# Patient Record
Sex: MALE | Race: WHITE | URBAN - METROPOLITAN AREA
[De-identification: names, ages, dates, MRNs, and addresses within clinical notes are randomized per-mention and may not be internally consistent; named-entity substitution may affect disease eponyms.]

---

## 2017-02-21 ENCOUNTER — TRANSFERRED RECORDS (OUTPATIENT)
Dept: HEALTH INFORMATION MANAGEMENT | Facility: CLINIC | Age: 52
End: 2017-02-21

## 2017-03-23 ENCOUNTER — TRANSFERRED RECORDS (OUTPATIENT)
Dept: HEALTH INFORMATION MANAGEMENT | Facility: CLINIC | Age: 52
End: 2017-03-23

## 2017-03-27 ENCOUNTER — TRANSFERRED RECORDS (OUTPATIENT)
Dept: HEALTH INFORMATION MANAGEMENT | Facility: CLINIC | Age: 52
End: 2017-03-27

## 2017-04-11 ENCOUNTER — TRANSFERRED RECORDS (OUTPATIENT)
Dept: HEALTH INFORMATION MANAGEMENT | Facility: CLINIC | Age: 52
End: 2017-04-11

## 2017-04-12 ENCOUNTER — MEDICAL CORRESPONDENCE (OUTPATIENT)
Dept: HEALTH INFORMATION MANAGEMENT | Facility: CLINIC | Age: 52
End: 2017-04-12

## 2017-05-25 ENCOUNTER — TRANSFERRED RECORDS (OUTPATIENT)
Dept: HEALTH INFORMATION MANAGEMENT | Facility: CLINIC | Age: 52
End: 2017-05-25

## 2017-06-08 ENCOUNTER — TRANSFERRED RECORDS (OUTPATIENT)
Dept: HEALTH INFORMATION MANAGEMENT | Facility: CLINIC | Age: 52
End: 2017-06-08

## 2017-06-19 ENCOUNTER — TRANSFERRED RECORDS (OUTPATIENT)
Dept: HEALTH INFORMATION MANAGEMENT | Facility: CLINIC | Age: 52
End: 2017-06-19

## 2017-06-28 ENCOUNTER — TRANSFERRED RECORDS (OUTPATIENT)
Dept: HEALTH INFORMATION MANAGEMENT | Facility: CLINIC | Age: 52
End: 2017-06-28

## 2017-07-14 ENCOUNTER — TRANSFERRED RECORDS (OUTPATIENT)
Dept: HEALTH INFORMATION MANAGEMENT | Facility: CLINIC | Age: 52
End: 2017-07-14

## 2017-07-15 ENCOUNTER — TRANSFERRED RECORDS (OUTPATIENT)
Dept: HEALTH INFORMATION MANAGEMENT | Facility: CLINIC | Age: 52
End: 2017-07-15

## 2017-07-28 ENCOUNTER — TRANSFERRED RECORDS (OUTPATIENT)
Dept: HEALTH INFORMATION MANAGEMENT | Facility: CLINIC | Age: 52
End: 2017-07-28

## 2017-08-01 ENCOUNTER — PRE VISIT (OUTPATIENT)
Dept: GASTROENTEROLOGY | Facility: CLINIC | Age: 52
End: 2017-08-01

## 2017-08-01 NOTE — TELEPHONE ENCOUNTER
1.  Date/reason for appt: 8/9/17 2PM - Abd Pain  2.  Referring provider: Dr. Boyd Shay  3.  Call to patient (Yes / No - short description): no, pt is referred  4.  Previous care at / records requested from: [facility] -- records are scanned in [facility], need imaging. CT Abd/Pelvis 2/21/17 report is scanned in [facility].        ** Faxed request to [facility] to mail CD of imaging.

## 2017-08-08 ENCOUNTER — TELEPHONE (OUTPATIENT)
Dept: GASTROENTEROLOGY | Facility: CLINIC | Age: 52
End: 2017-08-08

## 2017-08-09 ENCOUNTER — OFFICE VISIT (OUTPATIENT)
Dept: GASTROENTEROLOGY | Facility: CLINIC | Age: 52
End: 2017-08-09

## 2017-08-09 VITALS
HEART RATE: 85 BPM | WEIGHT: 212 LBS | SYSTOLIC BLOOD PRESSURE: 154 MMHG | HEIGHT: 71 IN | TEMPERATURE: 99.2 F | BODY MASS INDEX: 29.68 KG/M2 | OXYGEN SATURATION: 96 % | DIASTOLIC BLOOD PRESSURE: 102 MMHG

## 2017-08-09 DIAGNOSIS — R11.0 NAUSEA: ICD-10-CM

## 2017-08-09 DIAGNOSIS — R10.13 ABDOMINAL PAIN, EPIGASTRIC: ICD-10-CM

## 2017-08-09 DIAGNOSIS — R10.84 ABDOMINAL PAIN, GENERALIZED: ICD-10-CM

## 2017-08-09 DIAGNOSIS — F44.9 CONVERSION DISORDER: ICD-10-CM

## 2017-08-09 DIAGNOSIS — R19.7 DIARRHEA, UNSPECIFIED TYPE: ICD-10-CM

## 2017-08-09 DIAGNOSIS — R19.7 DIARRHEA, UNSPECIFIED TYPE: Primary | ICD-10-CM

## 2017-08-09 PROBLEM — R10.12 ABDOMINAL PAIN, LEFT UPPER QUADRANT: Status: ACTIVE | Noted: 2017-08-09

## 2017-08-09 LAB
AMYLASE SERPL-CCNC: 53 U/L (ref 30–110)
BASOPHILS # BLD AUTO: 0.1 10E9/L (ref 0–0.2)
BASOPHILS NFR BLD AUTO: 0.5 %
DIFFERENTIAL METHOD BLD: ABNORMAL
EOSINOPHIL # BLD AUTO: 0.1 10E9/L (ref 0–0.7)
EOSINOPHIL NFR BLD AUTO: 0.9 %
ERYTHROCYTE [DISTWIDTH] IN BLOOD BY AUTOMATED COUNT: 13.5 % (ref 10–15)
HCT VFR BLD AUTO: 46.2 % (ref 40–53)
HGB BLD-MCNC: 15.4 G/DL (ref 13.3–17.7)
IMM GRANULOCYTES # BLD: 0.1 10E9/L (ref 0–0.4)
IMM GRANULOCYTES NFR BLD: 0.8 %
LIPASE SERPL-CCNC: 113 U/L (ref 73–393)
LYMPHOCYTES # BLD AUTO: 4.2 10E9/L (ref 0.8–5.3)
LYMPHOCYTES NFR BLD AUTO: 28.8 %
MCH RBC QN AUTO: 31 PG (ref 26.5–33)
MCHC RBC AUTO-ENTMCNC: 33.3 G/DL (ref 31.5–36.5)
MCV RBC AUTO: 93 FL (ref 78–100)
MONOCYTES # BLD AUTO: 0.8 10E9/L (ref 0–1.3)
MONOCYTES NFR BLD AUTO: 5.7 %
NEUTROPHILS # BLD AUTO: 9.2 10E9/L (ref 1.6–8.3)
NEUTROPHILS NFR BLD AUTO: 63.3 %
NRBC # BLD AUTO: 0 10*3/UL
NRBC BLD AUTO-RTO: 0 /100
PLATELET # BLD AUTO: 379 10E9/L (ref 150–450)
RBC # BLD AUTO: 4.97 10E12/L (ref 4.4–5.9)
WBC # BLD AUTO: 14.5 10E9/L (ref 4–11)

## 2017-08-09 RX ORDER — GABAPENTIN 300 MG/1
900 TABLET, FILM COATED ORAL 3 TIMES DAILY
COMMUNITY

## 2017-08-09 RX ORDER — INSULIN GLARGINE 100 [IU]/ML
INJECTION, SOLUTION SUBCUTANEOUS AT BEDTIME
COMMUNITY

## 2017-08-09 RX ORDER — DICYCLOMINE HYDROCHLORIDE 10 MG/1
10-20 CAPSULE ORAL 2 TIMES DAILY PRN
Qty: 80 CAPSULE | Refills: 3 | Status: SHIPPED | OUTPATIENT
Start: 2017-08-09

## 2017-08-09 RX ORDER — VITAMIN B COMPLEX
1 TABLET ORAL
COMMUNITY
Start: 2015-04-21

## 2017-08-09 RX ORDER — ZOLPIDEM TARTRATE 10 MG/1
10 TABLET ORAL
COMMUNITY
Start: 2017-08-19

## 2017-08-09 RX ORDER — HYDROCODONE BITARTRATE AND ACETAMINOPHEN 5; 325 MG/1; MG/1
2 TABLET ORAL EVERY 6 HOURS PRN
COMMUNITY

## 2017-08-09 RX ORDER — LORAZEPAM 1 MG/1
TABLET ORAL
COMMUNITY
Start: 2017-06-21

## 2017-08-09 RX ORDER — TERAZOSIN 5 MG/1
5 CAPSULE ORAL
COMMUNITY
Start: 2016-07-06

## 2017-08-09 RX ORDER — TOPIRAMATE 25 MG/1
TABLET, FILM COATED ORAL
COMMUNITY
Start: 2017-07-12

## 2017-08-09 RX ORDER — BENZONATATE 200 MG/1
CAPSULE ORAL 3 TIMES DAILY PRN
COMMUNITY

## 2017-08-09 RX ORDER — LEVOTHYROXINE SODIUM 150 UG/1
150 TABLET ORAL
COMMUNITY
Start: 2016-07-07

## 2017-08-09 RX ORDER — LANOLIN ALCOHOL/MO/W.PET/CERES
100 CREAM (GRAM) TOPICAL
COMMUNITY

## 2017-08-09 ASSESSMENT — ENCOUNTER SYMPTOMS
WEIGHT LOSS: 0
HEMOPTYSIS: 0
SPUTUM PRODUCTION: 1
CONSTIPATION: 0
JOINT SWELLING: 0
NIGHT SWEATS: 0
MUSCLE CRAMPS: 0
ORTHOPNEA: 1
LOSS OF CONSCIOUSNESS: 0
SLEEP DISTURBANCES DUE TO BREATHING: 1
DIZZINESS: 1
MUSCLE WEAKNESS: 0
DIARRHEA: 1
HEADACHES: 1
LIGHT-HEADEDNESS: 0
BLOATING: 1
TACHYCARDIA: 0
DECREASED CONCENTRATION: 1
CLAUDICATION: 0
MEMORY LOSS: 1
INCREASED ENERGY: 0
NERVOUS/ANXIOUS: 1
TREMORS: 0
SPEECH CHANGE: 0
RECTAL PAIN: 0
EXERCISE INTOLERANCE: 0
POSTURAL DYSPNEA: 1
PALPITATIONS: 0
VOMITING: 0
POOR WOUND HEALING: 1
HEARTBURN: 0
PANIC: 1
DECREASED APPETITE: 1
EYE PAIN: 1
DISTURBANCES IN COORDINATION: 0
ABDOMINAL PAIN: 1
BLOOD IN STOOL: 1
NAIL CHANGES: 0
SNORES LOUDLY: 0
BACK PAIN: 1
SHORTNESS OF BREATH: 1
DOUBLE VISION: 0
INSOMNIA: 1
EYE IRRITATION: 0
TINGLING: 1
WEAKNESS: 1
RESPIRATORY PAIN: 0
LEG SWELLING: 1
HYPOTENSION: 0
BOWEL INCONTINENCE: 0
NUMBNESS: 1
NECK PAIN: 1
RECTAL BLEEDING: 0
SKIN CHANGES: 1
HALLUCINATIONS: 0
FATIGUE: 1
MYALGIAS: 0
NAUSEA: 1
JAUNDICE: 0
POLYDIPSIA: 0
CHILLS: 1
FEVER: 0
PARALYSIS: 0
ARTHRALGIAS: 0
WHEEZING: 1
WEIGHT GAIN: 0
STIFFNESS: 0
SEIZURES: 1
EYE WATERING: 0
DYSPNEA ON EXERTION: 1
LEG PAIN: 0
COUGH: 1
DEPRESSION: 1
HYPERTENSION: 1
ALTERED TEMPERATURE REGULATION: 0
POLYPHAGIA: 0
EYE REDNESS: 0
COUGH DISTURBING SLEEP: 0
SYNCOPE: 0

## 2017-08-09 ASSESSMENT — PAIN SCALES - GENERAL: PAINLEVEL: SEVERE PAIN (7)

## 2017-08-09 NOTE — PROGRESS NOTES
Addendum: full note to follow.    Had seizure or pseudoseizure in clinic. Head drooped to side, forearms curved in. Head and forearms had movements. He did not respond to touch or voice. I was able to hold his hand and move the forearm in and out and decrease the movements. Breathing continued. There was no drooling.  After a full minute, he began to raise his head and began to respond.     He was at ER within the last month for similar.  He has been having these movements about daily for years.   He calls them pseudoseizure.  He does not believe he needs the ER.  He wishes to go to lab and go home.    I spoke with his neuro NP Carol Lake NP in the office of his neurologist, Dr. Kori Dominguez, [place].  He carries the diagnosis of conversion disorder for many years.   He has had these episodes witnessed in clinics over time and at recent hospital stay.  She believes, if he refuses ER, it is safe to let him return home.  Notify family.    I spoke again with Mp.   He does not believe he needs ER,.  This has been no different than his other episodes, perhaps slightly less intense.  He will go to lab and go home.    I told him, if anything happens, we send him to ER.  If he leaves, I will still be notifying his son of this event.  Mp is in agreement with this plan.

## 2017-08-09 NOTE — NURSING NOTE
"Chief Complaint   Patient presents with     Consult     chronic abd pain        Vitals:    08/09/17 1400   BP: (!) 154/102   Pulse: 85   SpO2: 96%   Weight: 96.2 kg (212 lb)   Height: 1.803 m (5' 11\")       Body mass index is 29.57 kg/(m^2).                         "
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
## 2017-08-10 ENCOUNTER — TELEPHONE (OUTPATIENT)
Dept: GASTROENTEROLOGY | Facility: CLINIC | Age: 52
End: 2017-08-10

## 2017-08-10 ENCOUNTER — DOCUMENTATION ONLY (OUTPATIENT)
Dept: GASTROENTEROLOGY | Facility: CLINIC | Age: 52
End: 2017-08-10

## 2017-08-10 PROBLEM — B18.2 HEPATITIS C VIRUS CARRIER STATE (H): Status: ACTIVE | Noted: 2017-08-10
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
## 2017-08-10 NOTE — TELEPHONE ENCOUNTER
Called patient and voice message left on his cell phone. Requested that he return this call, phone number given. Advised that he see his primary provider today to f/u on the abdominal pain and elevated WBC.

## 2017-08-10 NOTE — TELEPHONE ENCOUNTER
"Call from ER from Mp earlier, he said he has C difficile.  Now call from [facility] Joseph, in ER thursdays and clinic other days  They received mp sent from clinic with no explanation.  They were told, \"abdominal pain\".    WBC now 14.4 but normal neutrophil count today.    Will get stool for C difficile and culture and viruses and fax results to us.  "
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
## 2017-08-10 NOTE — TELEPHONE ENCOUNTER
Called for Dr. Shay, his primary.  Forwarded to unidentified voicemail. Left message.  Forwarded to his phone nurse. Spoke directly.      Mp needs a call to go in today.  Needs to submit stool studies due to bloody diarrhea; did not submit them here yesterday. C diff and other.  WBC >14.  Diff normal  neuts elevated.    Keenly tender abdomen, he said, tender as usual, but usually left rather than yesterday's right mid abdomen.    My stat dictation not yet completed. When edited and signed, fax to   (tel )

## 2017-08-11 ENCOUNTER — TRANSFERRED RECORDS (OUTPATIENT)
Dept: HEALTH INFORMATION MANAGEMENT | Facility: CLINIC | Age: 52
End: 2017-08-11